# Patient Record
Sex: FEMALE | Race: WHITE | NOT HISPANIC OR LATINO | Employment: STUDENT | URBAN - METROPOLITAN AREA
[De-identification: names, ages, dates, MRNs, and addresses within clinical notes are randomized per-mention and may not be internally consistent; named-entity substitution may affect disease eponyms.]

---

## 2022-03-28 NOTE — PROGRESS NOTES
"Subjective:      Patient ID: John Perry is a 21 y.o. female.    Chief Complaint: Establish Care      HPI  Patient is here to establish care, and for preventive exam.  Rides horses, and walks/gym.  Energy good.  No f/c/sw/cough.  Sometimes heart rate goes fast. No cp/sob.  BMs normal.  Urine normal.  No vit D.  Hot intolerance, all life.  Worse now.  Also with frontal HA assoc with nausea.  Denies allergies.    SH:  No tob, no EtOH, [employer] animal science.    FH:  No cancer.    HM:  8/21 covid vaccines, 4/13 TDaP, 12/21 Gyn.    Review of Systems   Constitutional: Negative for appetite change, chills, diaphoresis and fever.   HENT: Negative for congestion, ear pain, rhinorrhea, sinus pressure and sore throat.    Respiratory: Negative for cough, chest tightness and shortness of breath.    Cardiovascular: Negative for chest pain and palpitations.   Gastrointestinal: Negative for blood in stool, constipation, diarrhea, nausea and vomiting.   Genitourinary: Negative for dysuria, frequency, hematuria, menstrual problem, urgency and vaginal discharge.   Musculoskeletal: Negative for arthralgias.   Skin: Negative for rash.   Neurological: Negative for dizziness and headaches.   Psychiatric/Behavioral: Negative for sleep disturbance. The patient is not nervous/anxious.          Objective:   /78   Pulse 108   Temp 97.6 °F (36.4 °C)   Ht 5' 5" (1.651 m)   Wt 105.6 kg (232 lb 12.9 oz)   LMP 03/19/2022   SpO2 99%   BMI 38.74 kg/m²     Physical Exam  Constitutional:       Appearance: She is well-developed.   HENT:      Right Ear: External ear normal. Tympanic membrane is not injected.      Left Ear: External ear normal. Tympanic membrane is not injected.   Eyes:      Conjunctiva/sclera: Conjunctivae normal.   Neck:      Thyroid: No thyromegaly.   Cardiovascular:      Rate and Rhythm: Normal rate and regular rhythm.      Heart sounds: No murmur heard.    No friction rub. No gallop.   Pulmonary:      Effort: Pulmonary " effort is normal.      Breath sounds: Normal breath sounds. No wheezing or rales.   Abdominal:      General: Bowel sounds are normal.      Palpations: Abdomen is soft. There is no mass.      Tenderness: There is no abdominal tenderness.   Musculoskeletal:      Cervical back: Normal range of motion and neck supple.   Lymphadenopathy:      Cervical: No cervical adenopathy.   Skin:     General: Skin is warm.      Findings: No rash.   Neurological:      Mental Status: She is alert and oriented to person, place, and time.             Assessment:       1. Encounter for preventive health examination    2. Tachycardia    3. Severe obesity (BMI 35.0-35.9 with comorbidity)          Plan:     Encounter for preventive health examination  -     CBC Auto Differential; Future; Expected date: 03/29/2022  -     Comprehensive Metabolic Panel; Future; Expected date: 03/29/2022  -     TSH; Future; Expected date: 03/29/2022  -     Hemoglobin A1C; Future; Expected date: 03/29/2022  -     Vitamin D; Future    Tachycardia  -     EKG 12-lead; Future    Severe obesity (BMI 35.0-35.9 with comorbidity)

## 2022-03-29 ENCOUNTER — LAB VISIT (OUTPATIENT)
Dept: LAB | Facility: HOSPITAL | Age: 22
End: 2022-03-29
Payer: COMMERCIAL

## 2022-03-29 ENCOUNTER — CLINICAL SUPPORT (OUTPATIENT)
Dept: CARDIOLOGY | Facility: CLINIC | Age: 22
End: 2022-03-29
Payer: COMMERCIAL

## 2022-03-29 ENCOUNTER — OFFICE VISIT (OUTPATIENT)
Dept: FAMILY MEDICINE | Facility: CLINIC | Age: 22
End: 2022-03-29
Payer: COMMERCIAL

## 2022-03-29 VITALS
HEART RATE: 108 BPM | WEIGHT: 232.81 LBS | BODY MASS INDEX: 38.79 KG/M2 | DIASTOLIC BLOOD PRESSURE: 78 MMHG | HEIGHT: 65 IN | SYSTOLIC BLOOD PRESSURE: 122 MMHG | OXYGEN SATURATION: 99 % | TEMPERATURE: 98 F

## 2022-03-29 DIAGNOSIS — R00.0 TACHYCARDIA: ICD-10-CM

## 2022-03-29 DIAGNOSIS — Z00.00 ENCOUNTER FOR PREVENTIVE HEALTH EXAMINATION: Primary | ICD-10-CM

## 2022-03-29 DIAGNOSIS — Z00.00 ENCOUNTER FOR PREVENTIVE HEALTH EXAMINATION: ICD-10-CM

## 2022-03-29 DIAGNOSIS — E66.01 SEVERE OBESITY (BMI 35.0-35.9 WITH COMORBIDITY): ICD-10-CM

## 2022-03-29 LAB
25(OH)D3+25(OH)D2 SERPL-MCNC: 34 NG/ML (ref 30–96)
ALBUMIN SERPL BCP-MCNC: 3.6 G/DL (ref 3.5–5.2)
ALP SERPL-CCNC: 80 U/L (ref 55–135)
ALT SERPL W/O P-5'-P-CCNC: 16 U/L (ref 10–44)
ANION GAP SERPL CALC-SCNC: 13 MMOL/L (ref 8–16)
AST SERPL-CCNC: 18 U/L (ref 10–40)
BASOPHILS # BLD AUTO: 0.03 K/UL (ref 0–0.2)
BASOPHILS NFR BLD: 0.3 % (ref 0–1.9)
BILIRUB SERPL-MCNC: 0.3 MG/DL (ref 0.1–1)
BUN SERPL-MCNC: 13 MG/DL (ref 6–20)
CALCIUM SERPL-MCNC: 9.9 MG/DL (ref 8.7–10.5)
CHLORIDE SERPL-SCNC: 102 MMOL/L (ref 95–110)
CO2 SERPL-SCNC: 23 MMOL/L (ref 23–29)
CREAT SERPL-MCNC: 0.8 MG/DL (ref 0.5–1.4)
DIFFERENTIAL METHOD: ABNORMAL
EOSINOPHIL # BLD AUTO: 0.1 K/UL (ref 0–0.5)
EOSINOPHIL NFR BLD: 1.2 % (ref 0–8)
ERYTHROCYTE [DISTWIDTH] IN BLOOD BY AUTOMATED COUNT: 13.1 % (ref 11.5–14.5)
EST. GFR  (AFRICAN AMERICAN): >60 ML/MIN/1.73 M^2
EST. GFR  (NON AFRICAN AMERICAN): >60 ML/MIN/1.73 M^2
GLUCOSE SERPL-MCNC: 177 MG/DL (ref 70–110)
HCT VFR BLD AUTO: 43 % (ref 37–48.5)
HGB BLD-MCNC: 13.9 G/DL (ref 12–16)
IMM GRANULOCYTES # BLD AUTO: 0.03 K/UL (ref 0–0.04)
IMM GRANULOCYTES NFR BLD AUTO: 0.3 % (ref 0–0.5)
LYMPHOCYTES # BLD AUTO: 1.7 K/UL (ref 1–4.8)
LYMPHOCYTES NFR BLD: 17.1 % (ref 18–48)
MCH RBC QN AUTO: 29.6 PG (ref 27–31)
MCHC RBC AUTO-ENTMCNC: 32.3 G/DL (ref 32–36)
MCV RBC AUTO: 92 FL (ref 82–98)
MONOCYTES # BLD AUTO: 0.4 K/UL (ref 0.3–1)
MONOCYTES NFR BLD: 4 % (ref 4–15)
NEUTROPHILS # BLD AUTO: 7.8 K/UL (ref 1.8–7.7)
NEUTROPHILS NFR BLD: 77.1 % (ref 38–73)
NRBC BLD-RTO: 0 /100 WBC
PLATELET # BLD AUTO: 353 K/UL (ref 150–450)
PMV BLD AUTO: 10.7 FL (ref 9.2–12.9)
POTASSIUM SERPL-SCNC: 3.9 MMOL/L (ref 3.5–5.1)
PROT SERPL-MCNC: 8 G/DL (ref 6–8.4)
RBC # BLD AUTO: 4.7 M/UL (ref 4–5.4)
SODIUM SERPL-SCNC: 138 MMOL/L (ref 136–145)
WBC # BLD AUTO: 10.09 K/UL (ref 3.9–12.7)

## 2022-03-29 PROCEDURE — 84443 ASSAY THYROID STIM HORMONE: CPT | Performed by: INTERNAL MEDICINE

## 2022-03-29 PROCEDURE — 93010 ELECTROCARDIOGRAM REPORT: CPT | Mod: S$GLB,,, | Performed by: STUDENT IN AN ORGANIZED HEALTH CARE EDUCATION/TRAINING PROGRAM

## 2022-03-29 PROCEDURE — 80053 COMPREHEN METABOLIC PANEL: CPT | Performed by: INTERNAL MEDICINE

## 2022-03-29 PROCEDURE — 99999 PR PBB SHADOW E&M-EST. PATIENT-LVL III: CPT | Mod: PBBFAC,,, | Performed by: INTERNAL MEDICINE

## 2022-03-29 PROCEDURE — 99999 PR PBB SHADOW E&M-EST. PATIENT-LVL III: ICD-10-PCS | Mod: PBBFAC,,, | Performed by: INTERNAL MEDICINE

## 2022-03-29 PROCEDURE — 93010 EKG 12-LEAD: ICD-10-PCS | Mod: S$GLB,,, | Performed by: STUDENT IN AN ORGANIZED HEALTH CARE EDUCATION/TRAINING PROGRAM

## 2022-03-29 PROCEDURE — 36415 COLL VENOUS BLD VENIPUNCTURE: CPT | Mod: PO | Performed by: INTERNAL MEDICINE

## 2022-03-29 PROCEDURE — 99385 PR PREVENTIVE VISIT,NEW,18-39: ICD-10-PCS | Mod: S$GLB,,, | Performed by: INTERNAL MEDICINE

## 2022-03-29 PROCEDURE — 83036 HEMOGLOBIN GLYCOSYLATED A1C: CPT | Performed by: INTERNAL MEDICINE

## 2022-03-29 PROCEDURE — 3078F PR MOST RECENT DIASTOLIC BLOOD PRESSURE < 80 MM HG: ICD-10-PCS | Mod: CPTII,S$GLB,, | Performed by: INTERNAL MEDICINE

## 2022-03-29 PROCEDURE — 3008F BODY MASS INDEX DOCD: CPT | Mod: CPTII,S$GLB,, | Performed by: INTERNAL MEDICINE

## 2022-03-29 PROCEDURE — 3078F DIAST BP <80 MM HG: CPT | Mod: CPTII,S$GLB,, | Performed by: INTERNAL MEDICINE

## 2022-03-29 PROCEDURE — 82306 VITAMIN D 25 HYDROXY: CPT | Performed by: INTERNAL MEDICINE

## 2022-03-29 PROCEDURE — 85025 COMPLETE CBC W/AUTO DIFF WBC: CPT | Performed by: INTERNAL MEDICINE

## 2022-03-29 PROCEDURE — 93005 ELECTROCARDIOGRAM TRACING: CPT | Mod: S$GLB,,, | Performed by: INTERNAL MEDICINE

## 2022-03-29 PROCEDURE — 99385 PREV VISIT NEW AGE 18-39: CPT | Mod: S$GLB,,, | Performed by: INTERNAL MEDICINE

## 2022-03-29 PROCEDURE — 93005 EKG 12-LEAD: ICD-10-PCS | Mod: S$GLB,,, | Performed by: INTERNAL MEDICINE

## 2022-03-29 PROCEDURE — 3074F SYST BP LT 130 MM HG: CPT | Mod: CPTII,S$GLB,, | Performed by: INTERNAL MEDICINE

## 2022-03-29 PROCEDURE — 3008F PR BODY MASS INDEX (BMI) DOCUMENTED: ICD-10-PCS | Mod: CPTII,S$GLB,, | Performed by: INTERNAL MEDICINE

## 2022-03-29 PROCEDURE — 3074F PR MOST RECENT SYSTOLIC BLOOD PRESSURE < 130 MM HG: ICD-10-PCS | Mod: CPTII,S$GLB,, | Performed by: INTERNAL MEDICINE

## 2022-03-30 LAB
ESTIMATED AVG GLUCOSE: 111 MG/DL (ref 68–131)
HBA1C MFR BLD: 5.5 % (ref 4–5.6)
TSH SERPL DL<=0.005 MIU/L-ACNC: 1.25 UIU/ML (ref 0.4–4)

## 2022-04-01 ENCOUNTER — TELEPHONE (OUTPATIENT)
Dept: FAMILY MEDICINE | Facility: CLINIC | Age: 22
End: 2022-04-01
Payer: COMMERCIAL

## 2022-04-01 DIAGNOSIS — R00.0 TACHYCARDIA: Primary | ICD-10-CM

## 2022-04-01 NOTE — TELEPHONE ENCOUNTER
S/w pt she states her dad wanted to ask about the pt having a MRI or a CT completed. Please advise.                 S/w pt informed pt per  Please tell pt her labs look good overall. I want an ultrasound of her heart to make sure all looks good- please schedule ECHO.  SM    Echo scheduled.     Pt verbalized understanding.

## 2022-04-01 NOTE — TELEPHONE ENCOUNTER
Please tell pt her labs look good overall. I want an ultrasound of her heart to make sure all looks good- please schedule ECHO.  SM

## 2022-04-04 ENCOUNTER — TELEPHONE (OUTPATIENT)
Dept: FAMILY MEDICINE | Facility: CLINIC | Age: 22
End: 2022-04-04
Payer: COMMERCIAL

## 2022-04-04 DIAGNOSIS — R51.9 FRONTAL HEADACHE: Primary | ICD-10-CM

## 2022-04-04 NOTE — TELEPHONE ENCOUNTER
Pt states her dad wanted her to ask about getting a mri or ct due to her having the migranes and nausea.     Please advise.

## 2022-04-04 NOTE — TELEPHONE ENCOUNTER
----- Message from Oxana Carrillo sent at 4/4/2022  8:42 AM CDT -----  .Type:  Patient Returning Call    Who Called: KATHRYN DALTON [[id number]]  Who Left Message for Patient:Nurse/MA  Does the patient know what this is regarding?:  Would the patient rather a call back or a response via [facility]? call  Best Call Back Number: [phone] (home)   Additional Information:

## 2022-04-07 ENCOUNTER — HOSPITAL ENCOUNTER (OUTPATIENT)
Dept: CARDIOLOGY | Facility: HOSPITAL | Age: 22
Discharge: HOME | End: 2022-04-07
Attending: INTERNAL MEDICINE
Payer: COMMERCIAL

## 2022-04-07 VITALS — BODY MASS INDEX: 38.65 KG/M2 | HEIGHT: 65 IN | WEIGHT: 232 LBS

## 2022-04-07 DIAGNOSIS — R00.0 TACHYCARDIA: ICD-10-CM

## 2022-04-07 LAB
AV INDEX (PROSTH): 0.86
AV MEAN GRADIENT: 5 MMHG
AV PEAK GRADIENT: 8 MMHG
AV VALVE AREA: 2.85 CM2
AV VELOCITY RATIO: 0.9
BSA FOR ECHO PROCEDURE: 2.2 M2
CV ECHO LV RWT: 0.46 CM
DOP CALC AO PEAK VEL: 1.44 M/S
DOP CALC AO VTI: 25.7 CM
DOP CALC LVOT AREA: 3.3 CM2
DOP CALC LVOT DIAMETER: 2.06 CM
DOP CALC LVOT PEAK VEL: 1.29 M/S
DOP CALC LVOT STROKE VOLUME: 73.29 CM3
DOP CALC RVOT PEAK VEL: 0.96 M/S
DOP CALC RVOT VTI: 18 CM
DOP CALCLVOT PEAK VEL VTI: 22 CM
E WAVE DECELERATION TIME: 149.18 MSEC
E/A RATIO: 1.22
ECHO EF ESTIMATED: 58 %
ECHO LV POSTERIOR WALL: 1.05 CM (ref 0.6–1.1)
EJECTION FRACTION: 60 %
FRACTIONAL SHORTENING: 30 % (ref 28–44)
INTERVENTRICULAR SEPTUM: 1.11 CM (ref 0.6–1.1)
IVRT: 74.22 MSEC
LA WIDTH: 4.25 CM
LEFT ATRIUM SIZE: 3.81 CM
LEFT INTERNAL DIMENSION IN SYSTOLE: 3.16 CM (ref 2.1–4)
LEFT VENTRICLE DIASTOLIC VOLUME INDEX: 44.53 ML/M2
LEFT VENTRICLE DIASTOLIC VOLUME: 93.95 ML
LEFT VENTRICLE MASS INDEX: 82 G/M2
LEFT VENTRICLE SYSTOLIC VOLUME INDEX: 18.8 ML/M2
LEFT VENTRICLE SYSTOLIC VOLUME: 39.6 ML
LEFT VENTRICULAR INTERNAL DIMENSION IN DIASTOLE: 4.53 CM (ref 3.5–6)
LEFT VENTRICULAR MASS: 172.37 G
LVOT MG: 3.45 MMHG
LVOT MV: 0.86 CM/S
MV PEAK A VEL: 0.59 M/S
MV PEAK E VEL: 0.72 M/S
MV STENOSIS PRESSURE HALF TIME: 43.26 MS
MV VALVE AREA P 1/2 METHOD: 5.09 CM2
PULM VEIN S/D RATIO: 1.53
PV MEAN GRADIENT: 2.25 MMHG
PV PEAK D VEL: 0.4 M/S
PV PEAK S VEL: 0.61 M/S
PV PEAK VELOCITY: 1.13 CM/S
RA MAJOR: 4.36 CM
RA PRESSURE: 3 MMHG
RIGHT VENTRICULAR END-DIASTOLIC DIMENSION: 2.95 CM
STJ: 2.59 CM

## 2022-04-07 PROCEDURE — 93306 TTE W/DOPPLER COMPLETE: CPT | Mod: 26,,, | Performed by: INTERNAL MEDICINE

## 2022-04-07 PROCEDURE — 93306 TTE W/DOPPLER COMPLETE: CPT

## 2022-04-07 PROCEDURE — 93306 ECHO (CUPID ONLY): ICD-10-PCS | Mod: 26,,, | Performed by: INTERNAL MEDICINE

## 2022-04-08 ENCOUNTER — TELEPHONE (OUTPATIENT)
Dept: FAMILY MEDICINE | Facility: CLINIC | Age: 22
End: 2022-04-08
Payer: COMMERCIAL

## 2022-04-08 NOTE — TELEPHONE ENCOUNTER
----- Message from Cinda Gonzales sent at 4/8/2022  3:15 PM CDT -----  .Type:  Patient Returning Call    Who Called:John  Who Left Message for Patient:nurse  Does the patient know what this is regarding?:test results  Would the patient rather a call back or a response via [facility]? Call back  Best Call Back Number:.[phone]    Additional Information: pt stated she had a missed call

## 2022-04-13 ENCOUNTER — TELEPHONE (OUTPATIENT)
Dept: FAMILY MEDICINE | Facility: CLINIC | Age: 22
End: 2022-04-13
Payer: COMMERCIAL

## 2022-04-13 NOTE — TELEPHONE ENCOUNTER
S/w pt informed pt Please inform pt that the u/s of her heart is all normal.    Pt verbalized understanding.

## 2022-04-13 NOTE — TELEPHONE ENCOUNTER
----- Message from Yobani Prado sent at 4/13/2022  8:47 AM CDT -----  Type:  Test Results    Who Called: Patient     Name of Test TRANSTHORACIC ECHO COMPLETE     Date of Test: 4/7/22     Ordering Provider: EVELYN CAST    Where the test was performed: [facility]    Would the patient rather a call back or a response via [facility]? Call back     Best Call Back Number:[phone] ([place])      Additional Information:

## 2022-04-14 ENCOUNTER — HOSPITAL ENCOUNTER (OUTPATIENT)
Dept: RADIOLOGY | Facility: HOSPITAL | Age: 22
Discharge: HOME | End: 2022-04-14
Attending: INTERNAL MEDICINE
Payer: COMMERCIAL

## 2022-04-14 DIAGNOSIS — R51.9 FRONTAL HEADACHE: ICD-10-CM

## 2022-04-14 PROCEDURE — 70450 CT HEAD WITHOUT CONTRAST: ICD-10-PCS | Mod: 26,,, | Performed by: RADIOLOGY

## 2022-04-14 PROCEDURE — 70450 CT HEAD/BRAIN W/O DYE: CPT | Mod: TC

## 2022-04-14 PROCEDURE — 70450 CT HEAD/BRAIN W/O DYE: CPT | Mod: 26,,, | Performed by: RADIOLOGY

## 2022-04-15 ENCOUNTER — TELEPHONE (OUTPATIENT)
Dept: FAMILY MEDICINE | Facility: CLINIC | Age: 22
End: 2022-04-15
Payer: COMMERCIAL

## 2022-04-21 ENCOUNTER — TELEPHONE (OUTPATIENT)
Dept: FAMILY MEDICINE | Facility: CLINIC | Age: 22
End: 2022-04-21
Payer: COMMERCIAL

## 2022-04-21 NOTE — TELEPHONE ENCOUNTER
----- Message from Wilma Parra sent at 4/21/2022  3:24 PM CDT -----  Contact: pt  Type:  Sooner Apoointment Request    Caller is requesting a sooner appointment.  Caller declined first available appointment listed below.  Caller will not accept being placed on the waitlist and is requesting a message be sent to doctor.  Name of Caller: pt   When is the first available appointment? 08/2022  Symptoms: discuss test results   Would the patient rather a call back or a response via [facility]? Phone   Best Call Back Number:  [phone]  Additional Information:

## 2022-09-22 PROBLEM — R06.02 SHORTNESS OF BREATH: Status: ACTIVE | Noted: 2022-09-22

## 2022-10-07 ENCOUNTER — TELEPHONE (OUTPATIENT)
Dept: CARDIOLOGY | Facility: CLINIC | Age: 22
End: 2022-10-07
Payer: COMMERCIAL

## 2022-10-07 DIAGNOSIS — R06.02 SHORTNESS OF BREATH: ICD-10-CM

## 2022-10-07 DIAGNOSIS — R00.0 TACHYCARDIA: Primary | ICD-10-CM

## 2022-10-12 ENCOUNTER — OFFICE VISIT (OUTPATIENT)
Dept: CARDIOLOGY | Facility: CLINIC | Age: 22
End: 2022-10-12
Payer: COMMERCIAL

## 2022-10-12 ENCOUNTER — TELEPHONE (OUTPATIENT)
Dept: CARDIOLOGY | Facility: CLINIC | Age: 22
End: 2022-10-12
Payer: COMMERCIAL

## 2022-10-12 ENCOUNTER — HOSPITAL ENCOUNTER (OUTPATIENT)
Dept: CARDIOLOGY | Facility: HOSPITAL | Age: 22
Discharge: HOME | End: 2022-10-12
Attending: INTERNAL MEDICINE
Payer: COMMERCIAL

## 2022-10-12 VITALS
WEIGHT: 242 LBS | HEART RATE: 106 BPM | OXYGEN SATURATION: 99 % | SYSTOLIC BLOOD PRESSURE: 125 MMHG | BODY MASS INDEX: 40.32 KG/M2 | DIASTOLIC BLOOD PRESSURE: 92 MMHG | HEIGHT: 65 IN

## 2022-10-12 DIAGNOSIS — R00.0 TACHYCARDIA: ICD-10-CM

## 2022-10-12 DIAGNOSIS — G43.809 OTHER MIGRAINE WITHOUT STATUS MIGRAINOSUS, NOT INTRACTABLE: ICD-10-CM

## 2022-10-12 DIAGNOSIS — R06.02 SHORTNESS OF BREATH: ICD-10-CM

## 2022-10-12 DIAGNOSIS — R07.9 CHEST PAIN, UNSPECIFIED TYPE: Primary | ICD-10-CM

## 2022-10-12 DIAGNOSIS — R00.2 PALPITATIONS: ICD-10-CM

## 2022-10-12 DIAGNOSIS — E66.01 SEVERE OBESITY (BMI 35.0-35.9 WITH COMORBIDITY): ICD-10-CM

## 2022-10-12 PROCEDURE — 3008F PR BODY MASS INDEX (BMI) DOCUMENTED: ICD-10-PCS | Mod: CPTII,S$GLB,, | Performed by: INTERNAL MEDICINE

## 2022-10-12 PROCEDURE — 93005 ELECTROCARDIOGRAM TRACING: CPT

## 2022-10-12 PROCEDURE — 99999 PR PBB SHADOW E&M-EST. PATIENT-LVL IV: ICD-10-PCS | Mod: PBBFAC,,, | Performed by: INTERNAL MEDICINE

## 2022-10-12 PROCEDURE — 1159F PR MEDICATION LIST DOCUMENTED IN MEDICAL RECORD: ICD-10-PCS | Mod: CPTII,S$GLB,, | Performed by: INTERNAL MEDICINE

## 2022-10-12 PROCEDURE — 1160F RVW MEDS BY RX/DR IN RCRD: CPT | Mod: CPTII,S$GLB,, | Performed by: INTERNAL MEDICINE

## 2022-10-12 PROCEDURE — 3080F DIAST BP >= 90 MM HG: CPT | Mod: CPTII,S$GLB,, | Performed by: INTERNAL MEDICINE

## 2022-10-12 PROCEDURE — 3008F BODY MASS INDEX DOCD: CPT | Mod: CPTII,S$GLB,, | Performed by: INTERNAL MEDICINE

## 2022-10-12 PROCEDURE — 3080F PR MOST RECENT DIASTOLIC BLOOD PRESSURE >= 90 MM HG: ICD-10-PCS | Mod: CPTII,S$GLB,, | Performed by: INTERNAL MEDICINE

## 2022-10-12 PROCEDURE — 3074F SYST BP LT 130 MM HG: CPT | Mod: CPTII,S$GLB,, | Performed by: INTERNAL MEDICINE

## 2022-10-12 PROCEDURE — 3044F HG A1C LEVEL LT 7.0%: CPT | Mod: CPTII,S$GLB,, | Performed by: INTERNAL MEDICINE

## 2022-10-12 PROCEDURE — 99999 PR PBB SHADOW E&M-EST. PATIENT-LVL IV: CPT | Mod: PBBFAC,,, | Performed by: INTERNAL MEDICINE

## 2022-10-12 PROCEDURE — 99204 PR OFFICE/OUTPT VISIT, NEW, LEVL IV, 45-59 MIN: ICD-10-PCS | Mod: S$GLB,,, | Performed by: INTERNAL MEDICINE

## 2022-10-12 PROCEDURE — 93010 EKG 12-LEAD: ICD-10-PCS | Mod: ,,, | Performed by: INTERNAL MEDICINE

## 2022-10-12 PROCEDURE — 99204 OFFICE O/P NEW MOD 45 MIN: CPT | Mod: S$GLB,,, | Performed by: INTERNAL MEDICINE

## 2022-10-12 PROCEDURE — 3074F PR MOST RECENT SYSTOLIC BLOOD PRESSURE < 130 MM HG: ICD-10-PCS | Mod: CPTII,S$GLB,, | Performed by: INTERNAL MEDICINE

## 2022-10-12 PROCEDURE — 1160F PR REVIEW ALL MEDS BY PRESCRIBER/CLIN PHARMACIST DOCUMENTED: ICD-10-PCS | Mod: CPTII,S$GLB,, | Performed by: INTERNAL MEDICINE

## 2022-10-12 PROCEDURE — 3044F PR MOST RECENT HEMOGLOBIN A1C LEVEL <7.0%: ICD-10-PCS | Mod: CPTII,S$GLB,, | Performed by: INTERNAL MEDICINE

## 2022-10-12 PROCEDURE — 93010 ELECTROCARDIOGRAM REPORT: CPT | Mod: ,,, | Performed by: INTERNAL MEDICINE

## 2022-10-12 PROCEDURE — 1159F MED LIST DOCD IN RCRD: CPT | Mod: CPTII,S$GLB,, | Performed by: INTERNAL MEDICINE

## 2022-10-12 RX ORDER — IBUPROFEN 600 MG/1
600 TABLET ORAL 3 TIMES DAILY
Qty: 21 TABLET | Refills: 0 | Status: SHIPPED | OUTPATIENT
Start: 2022-10-12 | End: 2022-10-19

## 2022-10-12 NOTE — PROGRESS NOTES
Subjective:   Patient ID:  John Perry is a 22 y.o. female who presents for cardiac consult of No chief complaint on file.    Referring by: Angeles Stauffer NP   Reason for consult: SOB, CP    HPI  The patient came in today for cardiac consult of No chief complaint on file.    10/12/22  John Perry is a 22 y.o. female pt with obesity, BMI >40, migraines, asthma presents for CV eval of CP/SOB.     9/22/22 Angeles Stauffer NP    Has been having SOB with walking for a while. She reports having an EKG and echocardiogram in April and was told it was normal.   She is now constantly SOB, which worsens with activity. Reports chest tightness, pressure, dizziness. Breathing and rest helps it go away. Usually once she sits to rest, it only takes a few minutes to go away. None of these symptoms occurring right now.   Denies any hx of asthma as a child. Denies any wheezing.   Reports her migraines are not as frequent, 2x/week now. Only taking propanolol, not imitrex due to jaw pain. Tylenol or ibuprofen helps it to fully go away.   Reports her cycles last 3 days, regular, manageable cramps       Pt had ECHO 4/202 with normal bi V function. BP today elevated initialyl 125/90. . BMI 40 - 242 lbs. She has been having SOB since high school thought it was ok but then started getting worse in college. Feels out of breath sitting down at times. CP only with mild exertion. She takes propranolol BID for migraines.     Patient feels no leg swelling, no PND, no palpitation, no dizziness, no syncope, no CNS symptoms.    Patient has dec exercise tolerance. 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     Patient is compliant with medications.    FH - grandfather - had heart infection? valve    Results for orders placed during the hospital encounter of 04/07/22    Echo    Interpretation Summary  · The left ventricle is normal in size with concentric remodeling and normal systolic function.  · The estimated ejection fraction is 60%.  · Normal left  ventricular diastolic function.  · Normal right ventricular size with normal right ventricular systolic function.  · Normal central venous pressure (3 mmHg).      Past Medical History:   Diagnosis Date    Irregular menses        Past Surgical History:   Procedure Laterality Date    ADENOIDECTOMY      TONSILLECTOMY         Social History     Tobacco Use    Smoking status: Never    Smokeless tobacco: Never   Substance Use Topics    Alcohol use: Never    Drug use: Never       Family History   Problem Relation Age of Onset    Hyperlipidemia Mother     Alcohol abuse Maternal Grandmother     Drug abuse Maternal Grandmother     Kidney disease Paternal Grandfather     Hyperlipidemia Paternal Grandfather     Hypertension Paternal Grandfather        Patient's Medications   New Prescriptions    No medications on file   Previous Medications    ALBUTEROL (PROVENTIL/VENTOLIN HFA) 90 MCG/ACTUATION INHALER    Inhale 2 puffs into the lungs every 6 (six) hours as needed for Wheezing.    DESOG-E.ESTRADIOL/E.ESTRADIOL (KARIVA) 0.15-0.02 MGX21 /0.01 MG X 5 PER TABLET    Take 1 tablet by mouth once daily.    PROPRANOLOL (INDERAL) 40 MG TABLET    TAKE 1 TABLET BY MOUTH TWICE A DAY   Modified Medications    No medications on file   Discontinued Medications    No medications on file       Review of Systems   Constitutional:  Positive for malaise/fatigue.   HENT: Negative.     Eyes: Negative.    Respiratory:  Positive for shortness of breath.    Cardiovascular:  Positive for chest pain and palpitations.   Gastrointestinal: Negative.    Genitourinary: Negative.    Musculoskeletal: Negative.    Skin: Negative.    Neurological: Negative.    Endo/Heme/Allergies: Negative.    Psychiatric/Behavioral: Negative.     All 12 systems otherwise negative.    Wt Readings from Last 3 Encounters:   10/12/22 109.8 kg (242 lb)   09/22/22 110.8 kg (244 lb 3.2 oz)   06/21/22 108.8 kg (239 lb 12.8 oz)     Temp Readings from Last 3 Encounters:   03/29/22 97.6 °F  "(36.4 °C)     BP Readings from Last 3 Encounters:   10/12/22 (!) 125/92   09/22/22 113/77   06/21/22 113/82     Pulse Readings from Last 3 Encounters:   10/12/22 106   09/22/22 82   06/21/22 86       BP (!) 125/92   Pulse 106   Ht 5' 5" (1.651 m)   Wt 109.8 kg (242 lb)   LMP 10/01/2022   SpO2 99%   BMI 40.27 kg/m²     Objective:   Physical Exam  Vitals and nursing note reviewed.   Constitutional:       General: She is not in acute distress.     Appearance: She is well-developed. She is obese. She is not diaphoretic.   HENT:      Head: Normocephalic and atraumatic.      Nose: Nose normal.   Eyes:      General: No scleral icterus.     Conjunctiva/sclera: Conjunctivae normal.   Neck:      Thyroid: No thyromegaly.      Vascular: No JVD.   Cardiovascular:      Rate and Rhythm: Normal rate and regular rhythm.      Heart sounds: S1 normal and S2 normal. No murmur heard.    No friction rub. No gallop. No S3 or S4 sounds.   Pulmonary:      Effort: Pulmonary effort is normal. No respiratory distress.      Breath sounds: Normal breath sounds. No stridor. No wheezing or rales.   Chest:      Chest wall: No tenderness.   Abdominal:      General: Bowel sounds are normal. There is no distension.      Palpations: Abdomen is soft. There is no mass.      Tenderness: There is no abdominal tenderness. There is no rebound.   Genitourinary:     Comments: Deferred  Musculoskeletal:         General: No tenderness or deformity. Normal range of motion.      Cervical back: Normal range of motion and neck supple.   Lymphadenopathy:      Cervical: No cervical adenopathy.   Skin:     General: Skin is warm and dry.      Coloration: Skin is not pale.      Findings: No erythema or rash.   Neurological:      Mental Status: She is alert and oriented to person, place, and time.      Motor: No abnormal muscle tone.      Coordination: Coordination normal.   Psychiatric:         Behavior: Behavior normal.         Thought Content: Thought content " normal.         Judgment: Judgment normal.       Lab Results   Component Value Date     03/29/2022    K 3.9 03/29/2022     03/29/2022    CO2 23 03/29/2022    BUN 13 03/29/2022    CREATININE 0.8 03/29/2022     (H) 03/29/2022    HGBA1C 5.5 03/29/2022    AST 18 03/29/2022    ALT 16 03/29/2022    ALBUMIN 3.6 03/29/2022    PROT 8.0 03/29/2022    BILITOT 0.3 03/29/2022    WBC 10.09 03/29/2022    HGB 13.9 03/29/2022    HCT 43.0 03/29/2022    MCV 92 03/29/2022     03/29/2022    TSH 1.252 03/29/2022     Assessment:      1. Chest pain, unspecified type    2. Shortness of breath    3. Tachycardia    4. Severe obesity (BMI 35.0-35.9 with comorbidity)    5. Other migraine without status migrainosus, not intractable    6. Palpitations        Plan:     CP, SOB, tachycardia  - order ECG stress test with oxygen sats  - order Holter   - order labs - D dimer, BNP, inflamm markers   - refer to pulm    2. Obesity, BMI 40  - cont weight loss with diet/exercise    3. Migraines  - cont tx per PCP/neuro    4. Dyspnea? Asthma   - refer to pulm  - cont tx PRN    Thank you for allowing me to participate in this patient's care. Please do not hesitate to contact me with any questions or concerns. Consult note has been forwarded to the referral physician.

## 2022-10-12 NOTE — TELEPHONE ENCOUNTER
The patient has been notified of this information and all questions answered.      ----- Message from Bogdan Siddiqi MD sent at 10/12/2022 12:58 PM CDT -----  Please contact the patient and let them know that their results reveal elevated CRP  due to inflammation, start Motrin 600mg three times a day for 1 week, follow up with PCP regarding further treatment/testing of inflammation, if symptoms do not improve call office can add other meds and refer to rheumatology if needed.

## 2022-10-21 ENCOUNTER — HOSPITAL ENCOUNTER (OUTPATIENT)
Dept: CARDIOLOGY | Facility: HOSPITAL | Age: 22
Discharge: HOME | End: 2022-10-21
Attending: INTERNAL MEDICINE
Payer: COMMERCIAL

## 2022-10-21 DIAGNOSIS — G43.809 OTHER MIGRAINE WITHOUT STATUS MIGRAINOSUS, NOT INTRACTABLE: ICD-10-CM

## 2022-10-21 DIAGNOSIS — R06.02 SHORTNESS OF BREATH: ICD-10-CM

## 2022-10-21 DIAGNOSIS — R00.0 TACHYCARDIA: ICD-10-CM

## 2022-10-21 DIAGNOSIS — R00.2 PALPITATIONS: ICD-10-CM

## 2022-10-21 DIAGNOSIS — R07.9 CHEST PAIN, UNSPECIFIED TYPE: ICD-10-CM

## 2022-10-21 DIAGNOSIS — E66.01 SEVERE OBESITY (BMI 35.0-35.9 WITH COMORBIDITY): ICD-10-CM

## 2022-10-21 LAB
CV STRESS BASE HR: 136 BPM
DIASTOLIC BLOOD PRESSURE: 95 MMHG
OHS CV CPX 1 MINUTE RECOVERY HEART RATE: 181 BPM
OHS CV CPX 85 PERCENT MAX PREDICTED HEART RATE MALE: 160
OHS CV CPX ESTIMATED METS: 5
OHS CV CPX MAX PREDICTED HEART RATE: 188
OHS CV CPX PATIENT IS FEMALE: 1
OHS CV CPX PATIENT IS MALE: 0
OHS CV CPX PEAK DIASTOLIC BLOOD PRESSURE: 94 MMHG
OHS CV CPX PEAK HEAR RATE: 193 BPM
OHS CV CPX PEAK RATE PRESSURE PRODUCT: NORMAL
OHS CV CPX PEAK SYSTOLIC BLOOD PRESSURE: 174 MMHG
OHS CV CPX PERCENT MAX PREDICTED HEART RATE ACHIEVED: 102
OHS CV CPX RATE PRESSURE PRODUCT PRESENTING: NORMAL
STRESS ECHO POST EXERCISE DUR MIN: 6 MINUTES
STRESS ECHO POST EXERCISE DUR SEC: 0 SECONDS
SYSTOLIC BLOOD PRESSURE: 147 MMHG

## 2022-10-21 PROCEDURE — 93018 EXERCISE STRESS - EKG (CUPID ONLY): ICD-10-PCS | Mod: ,,, | Performed by: INTERNAL MEDICINE

## 2022-10-21 PROCEDURE — 93226 XTRNL ECG REC<48 HR SCAN A/R: CPT

## 2022-10-21 PROCEDURE — 93016 EXERCISE STRESS - EKG (CUPID ONLY): ICD-10-PCS | Mod: ,,, | Performed by: INTERNAL MEDICINE

## 2022-10-21 PROCEDURE — 93018 CV STRESS TEST I&R ONLY: CPT | Mod: ,,, | Performed by: INTERNAL MEDICINE

## 2022-10-21 PROCEDURE — 93017 CV STRESS TEST TRACING ONLY: CPT

## 2022-10-21 PROCEDURE — 93227 XTRNL ECG REC<48 HR R&I: CPT | Mod: ,,, | Performed by: INTERNAL MEDICINE

## 2022-10-21 PROCEDURE — 93227 HOLTER MONITOR - 48 HOUR (CUPID ONLY): ICD-10-PCS | Mod: ,,, | Performed by: INTERNAL MEDICINE

## 2022-10-21 PROCEDURE — 93016 CV STRESS TEST SUPVJ ONLY: CPT | Mod: ,,, | Performed by: INTERNAL MEDICINE

## 2022-10-25 LAB
OHS CV EVENT MONITOR DAY: 2
OHS CV HOLTER LENGTH DECIMAL HOURS: 96
OHS CV HOLTER LENGTH HOURS: 48
OHS CV HOLTER LENGTH MINUTES: 0
OHS CV HOLTER SINUS AVERAGE HR: 109
OHS CV HOLTER SINUS MAX HR: 197
OHS CV HOLTER SINUS MIN HR: 50

## 2022-12-01 ENCOUNTER — TELEPHONE (OUTPATIENT)
Dept: PULMONOLOGY | Facility: CLINIC | Age: 22
End: 2022-12-01
Payer: COMMERCIAL

## 2022-12-01 DIAGNOSIS — R06.02 SHORTNESS OF BREATH: Primary | ICD-10-CM

## 2022-12-07 ENCOUNTER — CLINICAL SUPPORT (OUTPATIENT)
Dept: PULMONOLOGY | Facility: CLINIC | Age: 22
End: 2022-12-07
Payer: COMMERCIAL

## 2022-12-07 ENCOUNTER — HOSPITAL ENCOUNTER (OUTPATIENT)
Dept: RADIOLOGY | Facility: HOSPITAL | Age: 22
Discharge: HOME | End: 2022-12-07
Attending: INTERNAL MEDICINE
Payer: COMMERCIAL

## 2022-12-07 DIAGNOSIS — R06.02 SHORTNESS OF BREATH: ICD-10-CM

## 2022-12-07 LAB
BRPFT: ABNORMAL
FEF 25 75 CHG: -4.5 %
FEF 25 75 LLN: 2.67
FEF 25 75 POST REF: 111.5 %
FEF 25 75 PRE REF: 116.9 %
FEF 25 75 REF: 4.01
FET100 CHG: 0 %
FEV1 CHG: -8.4 %
FEV1 FVC CHG: -0.2 %
FEV1 FVC LLN: 77
FEV1 FVC POST REF: 102.7 %
FEV1 FVC PRE REF: 102.9 %
FEV1 FVC REF: 88
FEV1 LLN: 2.76
FEV1 POST REF: 81.5 %
FEV1 PRE REF: 89 %
FEV1 REF: 3.43
FVC CHG: -8.2 %
FVC LLN: 3.14
FVC POST REF: 78.7 %
FVC PRE REF: 85.8 %
FVC REF: 3.93
PEF CHG: -4.7 %
PEF LLN: 5.21
PEF POST REF: 94.1 %
PEF PRE REF: 98.7 %
PEF REF: 6.97
POST FEF 25 75: 4.47 L/S (ref 2.67–5.35)
POST FET 100: 6.98 SEC
POST FEV1 FVC: 90.47 % (ref 76.56–99.63)
POST FEV1: 2.8 L (ref 2.76–4.11)
POST FVC: 3.09 L (ref 3.14–4.72)
POST PEF: 6.56 L/S (ref 5.21–8.73)
PRE FEF 25 75: 4.69 L/S (ref 2.67–5.35)
PRE FET 100: 6.98 SEC
PRE FEV1 FVC: 90.66 % (ref 76.56–99.63)
PRE FEV1: 3.06 L (ref 2.76–4.11)
PRE FVC: 3.37 L (ref 3.14–4.72)
PRE PEF: 6.88 L/S (ref 5.21–8.73)

## 2022-12-07 PROCEDURE — 99999 PR PBB SHADOW E&M-EST. PATIENT-LVL I: ICD-10-PCS | Mod: PBBFAC,,,

## 2022-12-07 PROCEDURE — 94060 EVALUATION OF WHEEZING: CPT | Mod: S$GLB,,, | Performed by: INTERNAL MEDICINE

## 2022-12-07 PROCEDURE — 71046 X-RAY EXAM CHEST 2 VIEWS: CPT | Mod: 26,,, | Performed by: RADIOLOGY

## 2022-12-07 PROCEDURE — 71046 X-RAY EXAM CHEST 2 VIEWS: CPT | Mod: TC

## 2022-12-07 PROCEDURE — 71046 XR CHEST PA AND LATERAL: ICD-10-PCS | Mod: 26,,, | Performed by: RADIOLOGY

## 2022-12-07 PROCEDURE — 99999 PR PBB SHADOW E&M-EST. PATIENT-LVL I: CPT | Mod: PBBFAC,,,

## 2022-12-07 PROCEDURE — 94060 PR EVAL OF BRONCHOSPASM: ICD-10-PCS | Mod: S$GLB,,, | Performed by: INTERNAL MEDICINE

## 2022-12-08 ENCOUNTER — OFFICE VISIT (OUTPATIENT)
Dept: PULMONOLOGY | Facility: CLINIC | Age: 22
End: 2022-12-08
Payer: COMMERCIAL

## 2022-12-08 VITALS
OXYGEN SATURATION: 100 % | HEIGHT: 65 IN | BODY MASS INDEX: 41.65 KG/M2 | HEART RATE: 117 BPM | SYSTOLIC BLOOD PRESSURE: 124 MMHG | DIASTOLIC BLOOD PRESSURE: 80 MMHG | RESPIRATION RATE: 18 BRPM | WEIGHT: 250 LBS

## 2022-12-08 DIAGNOSIS — R06.02 SHORTNESS OF BREATH: ICD-10-CM

## 2022-12-08 DIAGNOSIS — E66.01 CLASS 3 SEVERE OBESITY DUE TO EXCESS CALORIES WITH BODY MASS INDEX (BMI) OF 40.0 TO 44.9 IN ADULT, UNSPECIFIED WHETHER SERIOUS COMORBIDITY PRESENT: Primary | ICD-10-CM

## 2022-12-08 DIAGNOSIS — E66.01 SEVERE OBESITY (BMI 35.0-35.9 WITH COMORBIDITY): ICD-10-CM

## 2022-12-08 PROCEDURE — 3074F PR MOST RECENT SYSTOLIC BLOOD PRESSURE < 130 MM HG: ICD-10-PCS | Mod: CPTII,S$GLB,, | Performed by: INTERNAL MEDICINE

## 2022-12-08 PROCEDURE — 99214 OFFICE O/P EST MOD 30 MIN: CPT | Mod: S$GLB,,, | Performed by: INTERNAL MEDICINE

## 2022-12-08 PROCEDURE — 3008F PR BODY MASS INDEX (BMI) DOCUMENTED: ICD-10-PCS | Mod: CPTII,S$GLB,, | Performed by: INTERNAL MEDICINE

## 2022-12-08 PROCEDURE — 3044F PR MOST RECENT HEMOGLOBIN A1C LEVEL <7.0%: ICD-10-PCS | Mod: CPTII,S$GLB,, | Performed by: INTERNAL MEDICINE

## 2022-12-08 PROCEDURE — 99214 PR OFFICE/OUTPT VISIT, EST, LEVL IV, 30-39 MIN: ICD-10-PCS | Mod: S$GLB,,, | Performed by: INTERNAL MEDICINE

## 2022-12-08 PROCEDURE — 99999 PR PBB SHADOW E&M-EST. PATIENT-LVL IV: ICD-10-PCS | Mod: PBBFAC,,, | Performed by: INTERNAL MEDICINE

## 2022-12-08 PROCEDURE — 3079F PR MOST RECENT DIASTOLIC BLOOD PRESSURE 80-89 MM HG: ICD-10-PCS | Mod: CPTII,S$GLB,, | Performed by: INTERNAL MEDICINE

## 2022-12-08 PROCEDURE — 1159F PR MEDICATION LIST DOCUMENTED IN MEDICAL RECORD: ICD-10-PCS | Mod: CPTII,S$GLB,, | Performed by: INTERNAL MEDICINE

## 2022-12-08 PROCEDURE — 1159F MED LIST DOCD IN RCRD: CPT | Mod: CPTII,S$GLB,, | Performed by: INTERNAL MEDICINE

## 2022-12-08 PROCEDURE — 99999 PR PBB SHADOW E&M-EST. PATIENT-LVL IV: CPT | Mod: PBBFAC,,, | Performed by: INTERNAL MEDICINE

## 2022-12-08 PROCEDURE — 3074F SYST BP LT 130 MM HG: CPT | Mod: CPTII,S$GLB,, | Performed by: INTERNAL MEDICINE

## 2022-12-08 PROCEDURE — 3079F DIAST BP 80-89 MM HG: CPT | Mod: CPTII,S$GLB,, | Performed by: INTERNAL MEDICINE

## 2022-12-08 PROCEDURE — 3044F HG A1C LEVEL LT 7.0%: CPT | Mod: CPTII,S$GLB,, | Performed by: INTERNAL MEDICINE

## 2022-12-08 PROCEDURE — 3008F BODY MASS INDEX DOCD: CPT | Mod: CPTII,S$GLB,, | Performed by: INTERNAL MEDICINE

## 2022-12-08 NOTE — ASSESSMENT & PLAN NOTE
Albuterol did not help symptoms  Normal Spirometry and Chest X Ray  shortness of breath probably related to obesity - BMI 41.60

## 2022-12-08 NOTE — PROGRESS NOTES
Subjective:     Patient ID: John Perry is a 22 y.o. female.    Chief Complaint:      HPI    Dyspnea  Patient complains of shortness of breath. Symptoms occur after one flight stairs, with one block walking. Symptoms began 2 years ago, unchanged since. Associated symptoms include  dyspnea on exertion, productive cough, tightness in chest, and prductive cough at night . She denies chest pain, located left chest. She does not have had recent travel. Weight has been stable. Symptoms are exacerbated by moderate activity. Symptoms are alleviated by rest.     Albuterol not effective      Past Medical History:   Diagnosis Date    Irregular menses      Past Surgical History:   Procedure Laterality Date    ADENOIDECTOMY      TONSILLECTOMY       Review of patient's allergies indicates:  No Known Allergies  Current Outpatient Medications on File Prior to Visit   Medication Sig Dispense Refill    desog-e.estradioL/e.estradioL (KARIVA) 0.15-0.02 mgx21 /0.01 mg x 5 per tablet Take 1 tablet by mouth once daily. 30 tablet 11    metoprolol succinate (TOPROL-XL) 25 MG 24 hr tablet TAKE 1 TABLET BY MOUTH EVERY DAY 30 tablet 1    [DISCONTINUED] albuterol (PROVENTIL/VENTOLIN HFA) 90 mcg/actuation inhaler Inhale 2 puffs into the lungs every 6 (six) hours as needed for Wheezing. 8 g 0     No current facility-administered medications on file prior to visit.     Social History     Socioeconomic History    Marital status: Single   Tobacco Use    Smoking status: Never    Smokeless tobacco: Never   Substance and Sexual Activity    Alcohol use: Never    Drug use: Never    Sexual activity: Never   Social History Narrative    ** Merged History Encounter **          Family History   Problem Relation Age of Onset    Hyperlipidemia Mother     ALS Maternal Uncle     Alcohol abuse Maternal Grandmother     Drug abuse Maternal Grandmother     Kidney disease Paternal Grandfather     Hyperlipidemia Paternal Grandfather     Hypertension Paternal Grandfather  "       Review of Systems   Constitutional:  Negative for fever and fatigue.   HENT:  Negative for postnasal drip and rhinorrhea.    Eyes:  Negative for redness and itching.   Respiratory:  Positive for shortness of breath and dyspnea on extertion. Negative for cough, wheezing and Paroxysmal Nocturnal Dyspnea.    Cardiovascular:  Negative for chest pain.   Genitourinary:  Negative for difficulty urinating and hematuria.   Endocrine:  Negative for polyphagia, cold intolerance and heat intolerance.    Musculoskeletal:  Negative for arthralgias.   Skin:  Negative for rash.   Gastrointestinal:  Negative for nausea, vomiting, abdominal pain and abdominal distention.   Neurological:  Negative for dizziness and headaches.   Hematological:  Negative for adenopathy. Does not bruise/bleed easily and no excessive bruising.   Psychiatric/Behavioral:  The patient is not nervous/anxious.      Objective:      /80   Pulse (!) 117   Resp 18   Ht 5' 5" (1.651 m)   Wt 113.4 kg (250 lb)   SpO2 100%   BMI 41.60 kg/m²   Physical Exam  Vitals and nursing note reviewed.   Constitutional:       Appearance: Normal appearance. She is well-developed.   HENT:      Head: Normocephalic and atraumatic.      Nose: Nose normal.   Eyes:      Conjunctiva/sclera: Conjunctivae normal.      Pupils: Pupils are equal, round, and reactive to light.   Neck:      Thyroid: No thyromegaly.      Vascular: No JVD.      Trachea: No tracheal deviation.   Cardiovascular:      Rate and Rhythm: Normal rate and regular rhythm.      Heart sounds: Normal heart sounds.   Pulmonary:      Effort: Pulmonary effort is normal. No respiratory distress.      Breath sounds: Normal breath sounds. No wheezing or rales.   Chest:      Chest wall: No tenderness.   Abdominal:      General: Bowel sounds are normal.      Palpations: Abdomen is soft.   Musculoskeletal:         General: Normal range of motion.      Cervical back: Neck supple.   Lymphadenopathy:      Cervical: No " cervical adenopathy.   Skin:     General: Skin is warm and dry.   Neurological:      Mental Status: She is alert and oriented to person, place, and time.     Personal Diagnostic Review  Chest x-ray: WNL    [place]: normal    Pulmonary Studies Review 12/8/2022   SpO2 100   Height 65   Weight 4000.03   BMI (Calculated) 41.6   Predicted Distance 501.82   Predicted Distance Meters (Calculated) 640.08       X-Ray Chest PA And Lateral  Narrative: EXAMINATION:  XR CHEST PA AND LATERAL    CLINICAL HISTORY:  SOB; Shortness of breath    TECHNIQUE:  PA and lateral views of the chest were performed.    COMPARISON:  None    FINDINGS:  The lungs are clear, with normal appearance of pulmonary vasculature and no pleural effusion or pneumothorax.    The cardiac silhouette is normal in size. The hilar and mediastinal contours are unremarkable.    Bones are intact.  Impression: No acute abnormality.    Electronically signed by: Idris Parsons  Date:    12/07/2022  Time:    13:41      Office Spirometry Results:     No flowsheet data found.  Pulmonary Studies Review 12/8/2022   SpO2 100   Height 65   Weight 4000.03   BMI (Calculated) 41.6   Predicted Distance 501.82   Predicted Distance Meters (Calculated) 640.08         Assessment:       Shortness of breath  Albuterol did not help symptoms  Normal Spirometry and Chest X Ray  shortness of breath probably related to obesity - BMI 41.60    Severe obesity (BMI 35.0-35.9 with comorbidity)  Last documentation =       BMI noted to be elevated. Changes in weight over time reviewed in chart (if available) and by patient recollection. Patient advised and counseled concerning the adverse medical consequences of obesity. Treatment options discussed - calorie restriction, increasing calorie expenditure, medical and surgical options noted.  The patient's severe obesity was monitored, evaluated, addressed and/or treated.   2013 AHA/ACC/TOS guideline for the management of overweight and obesity in  adults: a report of the American College of Cardiology/American Heart Association Task Force on Practice Guidelines and The Obesity Society    Class 3 severe obesity due to excess calories with body mass index (BMI) of 40.0 to 44.9 in adult, unspecified whether serious comorbidity present  -     Ambulatory referral/consult to Nutrition Services; Future; Expected date: 12/15/2022    Shortness of breath  -     Ambulatory referral/consult to Pulmonology    Severe obesity (BMI 35.0-35.9 with comorbidity)        Outpatient Encounter Medications as of 12/8/2022   Medication Sig Dispense Refill    desog-e.estradioL/e.estradioL (KARIVA) 0.15-0.02 mgx21 /0.01 mg x 5 per tablet Take 1 tablet by mouth once daily. 30 tablet 11    metoprolol succinate (TOPROL-XL) 25 MG 24 hr tablet TAKE 1 TABLET BY MOUTH EVERY DAY 30 tablet 1    [DISCONTINUED] albuterol (PROVENTIL/VENTOLIN HFA) 90 mcg/actuation inhaler Inhale 2 puffs into the lungs every 6 (six) hours as needed for Wheezing. 8 g 0     No facility-administered encounter medications on file as of 12/8/2022.     Plan:       Requested Prescriptions      No prescriptions requested or ordered in this encounter     Problem List Items Addressed This Visit       Severe obesity (BMI 35.0-35.9 with comorbidity)    Current Assessment & Plan     Last documentation =       BMI noted to be elevated. Changes in weight over time reviewed in chart (if available) and by patient recollection. Patient advised and counseled concerning the adverse medical consequences of obesity. Treatment options discussed - calorie restriction, increasing calorie expenditure, medical and surgical options noted.  The patient's severe obesity was monitored, evaluated, addressed and/or treated.   2013 AHA/ACC/TOS guideline for the management of overweight and obesity in adults: a report of the American College of Cardiology/American Heart Association Task Force on Practice Guidelines and The Obesity Society            Shortness of breath    Current Assessment & Plan     Albuterol did not help symptoms  Normal Spirometry and Chest X Ray  shortness of breath probably related to obesity - BMI 41.60          Other Visit Diagnoses       Class 3 severe obesity due to excess calories with body mass index (BMI) of 40.0 to 44.9 in adult, unspecified whether serious comorbidity present    -  Primary    Relevant Orders    Ambulatory referral/consult to Nutrition Services               No follow-ups on file.    MEDICAL DECISION MAKING: Moderate to high complexity.  Overall, the multiple problems listed are of moderate to high severity that may impact quality of life and activities of daily living. Side effects of medications, treatment plan as well as options and alternatives reviewed and discussed with patient. There was counseling of patient concerning these issues.    Total time spent in counseling and coordination of care - 30  minutes of total time spent on the encounter, which includes face to face time and non-face to face time preparing to see the patient (eg, review of tests), Obtaining and/or reviewing separately obtained history, Documenting clinical information in the electronic or other health record, Independently interpreting results (not separately reported) and communicating results to the patient/family/caregiver, or Care coordination (not separately reported).    Time was used in discussion of prognosis, risks, benefits of treatment, instructions and compliance with regimen . Discussion with other physicians and/or health care providers - home health or for use of durable medical equipment (oxygen, nebulizers, CPAP, BiPAP) occurred.

## 2023-01-10 PROBLEM — R79.89 ELEVATED TESTOSTERONE LEVEL IN FEMALE: Status: ACTIVE | Noted: 2023-01-10

## 2023-01-10 PROBLEM — E78.00 PURE HYPERCHOLESTEROLEMIA: Status: ACTIVE | Noted: 2023-01-10

## 2023-02-07 PROBLEM — F41.1 GAD (GENERALIZED ANXIETY DISORDER): Status: ACTIVE | Noted: 2023-02-07

## 2023-02-07 PROBLEM — I10 PRIMARY HYPERTENSION: Status: ACTIVE | Noted: 2023-02-07

## 2023-02-07 PROBLEM — E28.2 PCOS (POLYCYSTIC OVARIAN SYNDROME): Status: ACTIVE | Noted: 2018-03-12

## 2024-07-23 ENCOUNTER — PATIENT MESSAGE (OUTPATIENT)
Dept: SURGERY | Facility: CLINIC | Age: 24
End: 2024-07-23
Payer: COMMERCIAL

## 2025-01-23 PROBLEM — M54.50 BILATERAL LOW BACK PAIN WITHOUT SCIATICA: Status: ACTIVE | Noted: 2025-01-23

## 2025-01-23 PROBLEM — M54.9 DORSALGIA, UNSPECIFIED: Status: ACTIVE | Noted: 2025-01-23

## 2025-01-23 PROBLEM — K90.49 DAIRY PRODUCT INTOLERANCE: Status: ACTIVE | Noted: 2025-01-23

## 2025-01-23 PROBLEM — B37.2 YEAST INFECTION OF THE SKIN: Status: ACTIVE | Noted: 2025-01-23

## 2025-03-13 ENCOUNTER — APPOINTMENT (OUTPATIENT)
Dept: RADIOLOGY | Facility: HOSPITAL | Age: 25
End: 2025-03-13
Attending: NURSE PRACTITIONER
Payer: COMMERCIAL

## 2025-03-13 DIAGNOSIS — R10.31 RLQ ABDOMINAL PAIN: ICD-10-CM

## 2025-03-13 PROCEDURE — 76856 US EXAM PELVIC COMPLETE: CPT | Mod: 26,,, | Performed by: RADIOLOGY

## 2025-03-13 PROCEDURE — 76856 US EXAM PELVIC COMPLETE: CPT | Mod: TC,PO
